# Patient Record
Sex: FEMALE | Race: WHITE | NOT HISPANIC OR LATINO
[De-identification: names, ages, dates, MRNs, and addresses within clinical notes are randomized per-mention and may not be internally consistent; named-entity substitution may affect disease eponyms.]

---

## 2022-07-02 PROBLEM — Z00.00 ENCOUNTER FOR PREVENTIVE HEALTH EXAMINATION: Status: ACTIVE | Noted: 2022-07-02

## 2022-07-07 ENCOUNTER — APPOINTMENT (OUTPATIENT)
Dept: ORTHOPEDIC SURGERY | Facility: CLINIC | Age: 47
End: 2022-07-07

## 2022-07-07 VITALS — BODY MASS INDEX: 44.05 KG/M2 | HEIGHT: 64 IN | WEIGHT: 258 LBS

## 2022-07-07 DIAGNOSIS — Z78.9 OTHER SPECIFIED HEALTH STATUS: ICD-10-CM

## 2022-07-07 DIAGNOSIS — Z85.3 PERSONAL HISTORY OF MALIGNANT NEOPLASM OF BREAST: ICD-10-CM

## 2022-07-07 PROCEDURE — 73030 X-RAY EXAM OF SHOULDER: CPT | Mod: LT

## 2022-07-07 PROCEDURE — 99204 OFFICE O/P NEW MOD 45 MIN: CPT

## 2022-07-07 PROCEDURE — 73010 X-RAY EXAM OF SHOULDER BLADE: CPT | Mod: LT

## 2022-07-08 PROBLEM — Z78.9 NON-SMOKER: Status: ACTIVE | Noted: 2022-07-07

## 2022-07-08 PROBLEM — Z85.3 HISTORY OF MALIGNANT NEOPLASM OF BREAST: Status: RESOLVED | Noted: 2022-07-07 | Resolved: 2022-07-08

## 2022-07-08 PROBLEM — Z78.9 SOCIAL ALCOHOL USE: Status: ACTIVE | Noted: 2022-07-07

## 2022-07-08 NOTE — IMAGING
[Left] : left shoulder [There are no fractures, subluxations or dislocations. No significant abnormalities are seen] : There are no fractures, subluxations or dislocations. No significant abnormalities are seen [de-identified] : The patient is a well appearing 46 year old F of their stated age.\par Neck is supple & nontender to palpation. Negative Spurling's test.\par \par Effected Shoulder:  left\par Inspection:\par Scapula Winging: Negative\par Deformity: None\par Erythema: None\par Ecchymosis: None\par Abrasions: None\par Effusion: None\par \par Range of Motion:\par Active Forward Flexion: 180 degrees \par Active Abduction: 180 degrees \par Passive Forward Flexion: 180 degrees \par Passive Abduction: 180 degrees \par ER @ 90 degrees: 100 degrees\par IR @ 90 degrees: 45 degrees\par ER @ 0 degrees: 50 degrees\par \par Motor Exam:\par Forward Flexion: 3 out of 5\par Flexion Plane of Scapula: 3 out of 5\par Abduction: 3 out of 5\par Internal Rotation: 3 out of 5\par External Rotation: 3 out of 5\par Distal Motor Strength: 5 out of 5\par Stability Testing:\par Anterior: 1+\par Posterior: 1+\par Sulcus N: 1+\par Sulcus ER: 1+\par \par Provocative Tests:\par Drop Arm: Negative\par Impingement: +\par [place]: Negative\par X-Arm Adduction: Negative\par Belly Press: +\par Bear Hug: +\par Lift Off: +\par Apprehension: Negative\par Relocation: Negative\par Posterior Load & Shift: Negative\par Palpation:\par AC Joint: Nontender\par Clavicle: Nontender\par SC Joint: Nontender\par Bicepital Groove: Nontender\par Coracoid Process: Nontender\par Pectoralis Minor Tendon: Nontender\par Pectoralis Major Tendon: Nontender & palpably intact\par Latissimus Dorsi: Nontender \par Proximal Humerus: Nontender\par Scapula Body: Nontender\par Medial Scapula Boarder: Nontender\par Scapula Spine: Nontender\par \par Neurologic Exam: Sensation to Light Touch:\par Axillary: dec\par Ulnar: Grossly intact\par Radial: Grossly intact\par Median: Grossly intact\par Other:  N/A\par Circulatory/Pulses:\par Ulnar: 2+\par Radial: 2+\par Other Pertinent Findings: None\par \par Contralateral Shoulder\par Range of Motion:\par Active Forward Flexion: 180 degrees \par Active Abduction: 180 degrees \par Passive Forward Flexion: 180 degrees \par Passive Abduction: 180 degrees \par ER @ 90 degrees: 100 degrees\par IR @ 90 degrees: 45 degrees\par ER @ 0 degrees: 50 degrees\par Motor Exam:\par Forward Flexion: 5 out of 5\par Flexion Plane of Scapula: 5 out of 5\par Abduction: 5 out of 5\par Internal Rotation: 5 out of 5\par External Rotation: 5 out of 5\par Distal Motor Strength: 5 out of 5\par Stability Testing:\par Anterior: 1+\par Posterior: 1+\par Sulcus N: 1+\par Sulcus ER: 1+\par Other Pertinent Findings: None\par \par \par Assessment: The patient is a 46 year old F with left shoulder pain and radiographic and physical exam findings consistent with suspected RCT\par   \par The patient’s condition is acute\par Documents/Results Reviewed Today: Xray left shoulder scapula\par Tests/Studies Independently Interpreted Today: Xray left shoulder scapula is normal\par Pertinent findings include: Right:  -- Left: , 3/5 strength, +IMP, +BH/BP/LO, tender biceptial groove, decreased sensation axillary \par Confounding medical conditions/concerns: none\par \par Plan: Patient is experiencing weakness and significant pain. They will need to obtain MRI of their left shoulder. B/c of dec sensation in their LUE they will also need to obtain a c-spine MRI. Provided with script for PT\par Tests Ordered: MRI left shoulder to eval for RCT, MRI c-spine to eval for disc herniation\par Prescription Medications Ordered: none\par Braces/DME Ordered: none\par Activity/Work/Sports Status: homemaker \par Additional Instructions: none\par Follow-Up: after MRI\par \par The patient's current medication management of their orthopedic diagnosis was reviewed today:\par (1) We discussed a comprehensive treatment plan that included possible pharmaceutical management involving the use of prescription strength medications including but not limited to options such as oral Naprosyn 500mg BID, once daily Meloxicam 15 mg, or 500-650 mg Tylenol versus over the counter oral medications and topical prescription NSAID Pennsaid vs over the counter Voltaren gel.\par (2) There is a moderate risk of morbidity with further treatment, especially from use of prescription strength medications and possible side effects of these medications which include upset stomach with oral medications, skin reactions to topical medications and cardiac/renal issues with long term use.\par (3) I recommended that the patient follow-up with their medical physician to discuss any significant specific potential issues with long term medication use such as interactions with current medications or with exacerbation of underlying medical comorbidities.\par (4) The benefits and risks associated with use of injectable, oral or topical, prescription and over the counter anti-inflammatory medications were discussed with the patient. The patient voiced understanding of the risks including but not limited to bleeding, stroke, kidney dysfunction, heart disease, and were referred to the black box warning label for further information.\par \par I, eNno Jose, attest that this documentation has been prepared under the direction and in the presence of Provider Dr. Reich\par \par The documentation recorded by the scribe accurately reflects the service I personally performed and the decisions made by me.\par \par

## 2022-07-08 NOTE — HISTORY OF PRESENT ILLNESS
[de-identified] : The patient is a 46 year old right hand dominant female who presents today complaining of left shoulder .\par Date of Injury/Onset: 10/2021\par Pain:    At Rest: 3/10 \par With Activity:  6/10 \par Mechanism of injury: originally injured her shoulder while working out and then reinjured it about 3 weeks ago and has hand constant pain ever since\par This is not a Work Related Injury being treated under Worker's Compensation.\par This is not an athletic injury occurring associated with an interscholastic or organized sports team.\par Quality of symptoms: burning sensation, nagging pain, weakness, occasional numbness in to fingers with overuse\par Improves with: rest, heat\par Worse with: overuse\par Prior treatment: another Ortho, does not remember name\par Prior Imaging: none\par Out of work/sport: not working\par 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

## 2022-07-18 ENCOUNTER — RESULT REVIEW (OUTPATIENT)
Age: 47
End: 2022-07-18

## 2022-07-21 ENCOUNTER — TRANSCRIPTION ENCOUNTER (OUTPATIENT)
Age: 47
End: 2022-07-21

## 2022-07-28 ENCOUNTER — APPOINTMENT (OUTPATIENT)
Dept: ORTHOPEDIC SURGERY | Facility: CLINIC | Age: 47
End: 2022-07-28

## 2022-07-28 VITALS — BODY MASS INDEX: 44.05 KG/M2 | HEIGHT: 64 IN | WEIGHT: 258 LBS

## 2022-07-28 DIAGNOSIS — M75.112 INCOMPLETE ROTATOR CUFF TEAR OR RUPTURE OF LEFT SHOULDER, NOT SPECIFIED AS TRAUMATIC: ICD-10-CM

## 2022-07-28 PROCEDURE — 99214 OFFICE O/P EST MOD 30 MIN: CPT

## 2022-07-28 RX ORDER — METHYLPREDNISOLONE 4 MG/1
4 TABLET ORAL
Qty: 1 | Refills: 0 | Status: ACTIVE | COMMUNITY
Start: 2022-07-28 | End: 1900-01-01

## 2022-07-29 PROBLEM — M75.112 NONTRAUMATIC INCOMPLETE TEAR OF LEFT ROTATOR CUFF: Status: ACTIVE | Noted: 2022-07-28

## 2022-07-29 NOTE — HISTORY OF PRESENT ILLNESS
[de-identified] : The patient is a 46 year old right hand dominant female who presents today complaining of left shoulder .\par Date of Injury/Onset: 10/2021\par Pain:    At Rest: 3/10 \par With Activity:  6/10 \par Mechanism of injury: originally injured her shoulder while working out and then reinjured it about 3 weeks ago and has hand constant pain ever since\par This is not a Work Related Injury being treated under Worker's Compensation.\par This is not an athletic injury occurring associated with an interscholastic or organized sports team.\par Quality of symptoms: burning sensation, nagging pain, weakness, occasional numbness in to fingers with overuse\par Improves with: rest, heat\par Worse with: overuse\par Treatment/Imaging/Studies Since Last Visit: MRI\par 	Reports Available For Review Today: MRI report\par Out of work/sport: not working\par 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

## 2022-07-29 NOTE — DATA REVIEWED
[Cervical Spine] : cervical spine [MRI] : MRI [Left] : left [Shoulder] : shoulder [Report was reviewed and noted in the chart] : The report was reviewed and noted in the chart [I independently reviewed and interpreted images and report] : I independently reviewed and interpreted images and report [I reviewed the films/CD and additionally noted] : I reviewed the films/CD and additionally noted [FreeTextEntry1] : disc herniation impinging on C7 nerve root to the left [FreeTextEntry2] : partial rotator cuff tear

## 2022-07-29 NOTE — IMAGING
[Left] : left shoulder [There are no fractures, subluxations or dislocations. No significant abnormalities are seen] : There are no fractures, subluxations or dislocations. No significant abnormalities are seen [de-identified] : The patient is a well appearing 46 year old F of their stated age.\par Neck is supple & nontender to palpation. Negative Spurling's test.\par \par Effected Shoulder:  left\par Inspection:\par Scapula Winging: Negative\par Deformity: None\par Erythema: None\par Ecchymosis: None\par Abrasions: None\par Effusion: None\par \par Range of Motion:\par Active Forward Flexion: 180 degrees \par Active Abduction: 180 degrees \par Passive Forward Flexion: 180 degrees \par Passive Abduction: 180 degrees \par ER @ 90 degrees: 100 degrees\par IR @ 90 degrees: 45 degrees\par ER @ 0 degrees: 50 degrees\par \par Motor Exam:\par Forward Flexion: 3 out of 5\par Flexion Plane of Scapula: 3 out of 5\par Abduction: 3 out of 5\par Internal Rotation: 3 out of 5\par External Rotation: 3 out of 5\par Distal Motor Strength: 5 out of 5\par Stability Testing:\par Anterior: 1+\par Posterior: 1+\par Sulcus N: 1+\par Sulcus ER: 1+\par \par Provocative Tests:\par Drop Arm: Negative\par Impingement: +\par [place]: Negative\par X-Arm Adduction: Negative\par Belly Press: +\par Bear Hug: +\par Lift Off: +\par Apprehension: Negative\par Relocation: Negative\par Posterior Load & Shift: Negative\par Palpation:\par AC Joint: Nontender\par Clavicle: Nontender\par SC Joint: Nontender\par Bicepital Groove: Nontender\par Coracoid Process: Nontender\par Pectoralis Minor Tendon: Nontender\par Pectoralis Major Tendon: Nontender & palpably intact\par Latissimus Dorsi: Nontender \par Proximal Humerus: Nontender\par Scapula Body: Nontender\par Medial Scapula Boarder: Nontender\par Scapula Spine: Nontender\par \par Neurologic Exam: Sensation to Light Touch:\par Axillary: dec\par Ulnar: Grossly intact\par Radial: Grossly intact\par Median: Grossly intact\par Other:  N/A\par Circulatory/Pulses:\par Ulnar: 2+\par Radial: 2+\par Other Pertinent Findings: None\par \par Contralateral Shoulder\par Range of Motion:\par Active Forward Flexion: 180 degrees \par Active Abduction: 180 degrees \par Passive Forward Flexion: 180 degrees \par Passive Abduction: 180 degrees \par ER @ 90 degrees: 100 degrees\par IR @ 90 degrees: 45 degrees\par ER @ 0 degrees: 50 degrees\par Motor Exam:\par Forward Flexion: 5 out of 5\par Flexion Plane of Scapula: 5 out of 5\par Abduction: 5 out of 5\par Internal Rotation: 5 out of 5\par External Rotation: 5 out of 5\par Distal Motor Strength: 5 out of 5\par Stability Testing:\par Anterior: 1+\par Posterior: 1+\par Sulcus N: 1+\par Sulcus ER: 1+\par Other Pertinent Findings: None\par \par \par Assessment: The patient is a 46 year old F with left shoulder pain and radiographic and physical exam findings consistent with cervical disc herniation and partial RCT\par   \par The patient’s condition is acute\par Documents/Results Reviewed Today: MRI cervical spine, MRI left shoulder\par Tests/Studies Independently Interpreted Today: MRI cervical spine reveals disc herniation impinging on C7 nerve root to the left -- MRI left shoulder reveals partial RCT\par Pertinent findings include: Right:  -- Left: , 3/5 strength, +IMP, +BH/BP/LO, tender biceptial groove, decreased sensation axillary \par Confounding medical conditions/concerns: none\par \par Plan: Patient is referred to Dr. Houston for [facility] consultation. Provided with script for PT. \par Tests Ordered: none\par Prescription Medications Ordered: MDP\par Braces/DME Ordered: none\par Activity/Work/Sports Status: homemaker \par Additional Instructions: none\par Follow-Up: 6wks\par \par The patient's current medication management of their orthopedic diagnosis was reviewed today:\par (1) We discussed a comprehensive treatment plan that included possible pharmaceutical management involving the use of prescription strength medications including but not limited to options such as oral Naprosyn 500mg BID, once daily Meloxicam 15 mg, or 500-650 mg Tylenol versus over the counter oral medications and topical prescription NSAID Pennsaid vs over the counter Voltaren gel.\par (2) There is a moderate risk of morbidity with further treatment, especially from use of prescription strength medications and possible side effects of these medications which include upset stomach with oral medications, skin reactions to topical medications and cardiac/renal issues with long term use.\par (3) I recommended that the patient follow-up with their medical physician to discuss any significant specific potential issues with long term medication use such as interactions with current medications or with exacerbation of underlying medical comorbidities.\par (4) The benefits and risks associated with use of injectable, oral or topical, prescription and over the counter anti-inflammatory medications were discussed with the patient. The patient voiced understanding of the risks including but not limited to bleeding, stroke, kidney dysfunction, heart disease, and were referred to the black box warning label for further information.\par \par I, Neno Jose, attest that this documentation has been prepared under the direction and in the presence of Provider Dr. Reich\par \par The documentation recorded by the scribe accurately reflects the service I personally performed and the decisions made by me.\par

## 2022-09-08 ENCOUNTER — APPOINTMENT (OUTPATIENT)
Dept: PAIN MANAGEMENT | Facility: CLINIC | Age: 47
End: 2022-09-08

## 2022-09-08 VITALS — WEIGHT: 258 LBS | HEIGHT: 64 IN | BODY MASS INDEX: 44.05 KG/M2

## 2022-09-08 DIAGNOSIS — M25.512 PAIN IN LEFT SHOULDER: ICD-10-CM

## 2022-09-08 DIAGNOSIS — M54.12 RADICULOPATHY, CERVICAL REGION: ICD-10-CM

## 2022-09-08 DIAGNOSIS — R20.2 PARESTHESIA OF SKIN: ICD-10-CM

## 2022-09-08 DIAGNOSIS — M50.20 OTHER CERVICAL DISC DISPLACEMENT, UNSPECIFIED CERVICAL REGION: ICD-10-CM

## 2022-09-08 PROCEDURE — 99203 OFFICE O/P NEW LOW 30 MIN: CPT

## 2022-09-08 NOTE — DATA REVIEWED
[Cervical Spine] : cervical spine [MRI] : MRI [Left] : left [Shoulder] : shoulder [Report was reviewed and noted in the chart] : The report was reviewed and noted in the chart [I reviewed the films/CD] : I reviewed the films/CD

## 2022-09-09 NOTE — HISTORY OF PRESENT ILLNESS
[Neck] : neck [Sudden] : sudden [6] : 6 [Diffuse] : diffuse [Dull/Aching] : dull/aching [Constant] : constant [Household chores] : household chores [Leisure] : leisure [Meds] : meds [Exercising] : exercising [] : Post Surgical Visit: no [FreeTextEntry1] : left shoulder [FreeTextEntry7] : left arm, left hand and fingers [de-identified] : cervical mri at [facility] [place]

## 2022-09-09 NOTE — ASSESSMENT
[FreeTextEntry1] : A discussion regarding available pain management treatment options occurred with the patient.  These included interventional, rehabilitative, pharmacological, and alternative modalities. We will proceed with the following: \par \par Interventional treatment options: \par - Discussed left PM TRIPP for diagnostic / therapeutic benefit \par - will await results of EMG study to rule out peripheral entrapment / neuropathy \par - see additional instructions below \par \par Rehabilitative options:\par - continue physical therapy \par - participation in active HEP was discussed \par \par Medication based treatment options: \par - completed MDP \par - continue OTC NSAIDS PRN \par - see additional instructions below \par \par Complementary treatment options: \par - Weight management and lifestyle modifications discussed \par - See additional instructions below \par \par Additional treatment recommendations as follows: \par - obtain EMG /NCV BUE to rule out peripheral nerve entrapment \par - F/U for EMG review \par \par The documentation recorded by the scribe, in my presence, accurately reflects the service I personally performed and the decisions made by me with my edits as appropriate.\par \par I, Paresh Ferreira acting as scribe, attest that this documentation has been prepared under the direction and in the presence of Provider Raul Haddad DO.

## 2022-09-09 NOTE — PHYSICAL EXAM
[Normal Coordination] : normal coordination [Normal Mood and Affect] : normal mood and affect [Orientated] : orientated [Left] : left hand [de-identified] : obesity [] : negative Roberts reflex [de-identified] : Reduced sensation to light touch LUE

## 2022-09-09 NOTE — REASON FOR VISIT
[Initial Consultation] : an initial pain management consultation [FreeTextEntry2] : left shoulder and neck pain

## 2022-09-22 ENCOUNTER — APPOINTMENT (OUTPATIENT)
Dept: ORTHOPEDIC SURGERY | Facility: CLINIC | Age: 47
End: 2022-09-22

## 2023-02-16 ENCOUNTER — NON-APPOINTMENT (OUTPATIENT)
Age: 48
End: 2023-02-16

## 2024-04-15 ENCOUNTER — EMERGENCY (EMERGENCY)
Facility: HOSPITAL | Age: 49
LOS: 1 days | Discharge: HOME | End: 2024-04-15
Attending: EMERGENCY MEDICINE
Payer: COMMERCIAL

## 2024-04-15 VITALS
SYSTOLIC BLOOD PRESSURE: 127 MMHG | RESPIRATION RATE: 20 BRPM | OXYGEN SATURATION: 98 % | DIASTOLIC BLOOD PRESSURE: 82 MMHG | HEART RATE: 86 BPM

## 2024-04-15 VITALS — HEIGHT: 67 IN | WEIGHT: 193.57 LBS | RESPIRATION RATE: 16 BRPM

## 2024-04-15 DIAGNOSIS — Z85.3 PERSONAL HISTORY OF MALIGNANT NEOPLASM OF BREAST: ICD-10-CM

## 2024-04-15 DIAGNOSIS — Z86.16 PERSONAL HISTORY OF COVID-19: ICD-10-CM

## 2024-04-15 DIAGNOSIS — E11.9 TYPE 2 DIABETES MELLITUS WITHOUT COMPLICATIONS: ICD-10-CM

## 2024-04-15 DIAGNOSIS — R00.2 PALPITATIONS: ICD-10-CM

## 2024-04-15 DIAGNOSIS — I26.99 OTHER PULMONARY EMBOLISM WITHOUT ACUTE COR PULMONALE: ICD-10-CM

## 2024-04-15 LAB
ALBUMIN SERPL ELPH-MCNC: 4.1 G/DL — SIGNIFICANT CHANGE UP (ref 3.3–5.2)
ALP SERPL-CCNC: 60 U/L — SIGNIFICANT CHANGE UP (ref 40–120)
ALT FLD-CCNC: 13 U/L — SIGNIFICANT CHANGE UP
ANION GAP SERPL CALC-SCNC: 12 MMOL/L — SIGNIFICANT CHANGE UP (ref 5–17)
AST SERPL-CCNC: 15 U/L — SIGNIFICANT CHANGE UP
BASOPHILS # BLD AUTO: 0.04 K/UL — SIGNIFICANT CHANGE UP (ref 0–0.2)
BASOPHILS NFR BLD AUTO: 0.7 % — SIGNIFICANT CHANGE UP (ref 0–2)
BILIRUB SERPL-MCNC: 0.4 MG/DL — SIGNIFICANT CHANGE UP (ref 0.4–2)
BUN SERPL-MCNC: 15.3 MG/DL — SIGNIFICANT CHANGE UP (ref 8–20)
CALCIUM SERPL-MCNC: 9.3 MG/DL — SIGNIFICANT CHANGE UP (ref 8.4–10.5)
CHLORIDE SERPL-SCNC: 104 MMOL/L — SIGNIFICANT CHANGE UP (ref 96–108)
CO2 SERPL-SCNC: 26 MMOL/L — SIGNIFICANT CHANGE UP (ref 22–29)
CREAT SERPL-MCNC: 0.8 MG/DL — SIGNIFICANT CHANGE UP (ref 0.5–1.3)
D DIMER BLD IA.RAPID-MCNC: 294 NG/ML DDU — HIGH
EGFR: 90 ML/MIN/1.73M2 — SIGNIFICANT CHANGE UP
EOSINOPHIL # BLD AUTO: 0.07 K/UL — SIGNIFICANT CHANGE UP (ref 0–0.5)
EOSINOPHIL NFR BLD AUTO: 1.2 % — SIGNIFICANT CHANGE UP (ref 0–6)
GLUCOSE SERPL-MCNC: 72 MG/DL — SIGNIFICANT CHANGE UP (ref 70–99)
HCG SERPL-ACNC: <4 MIU/ML — SIGNIFICANT CHANGE UP
HCT VFR BLD CALC: 38.8 % — SIGNIFICANT CHANGE UP (ref 34.5–45)
HGB BLD-MCNC: 13.3 G/DL — SIGNIFICANT CHANGE UP (ref 11.5–15.5)
IMM GRANULOCYTES NFR BLD AUTO: 0.2 % — SIGNIFICANT CHANGE UP (ref 0–0.9)
LYMPHOCYTES # BLD AUTO: 2.79 K/UL — SIGNIFICANT CHANGE UP (ref 1–3.3)
LYMPHOCYTES # BLD AUTO: 48.1 % — HIGH (ref 13–44)
MCHC RBC-ENTMCNC: 32.4 PG — SIGNIFICANT CHANGE UP (ref 27–34)
MCHC RBC-ENTMCNC: 34.3 GM/DL — SIGNIFICANT CHANGE UP (ref 32–36)
MCV RBC AUTO: 94.6 FL — SIGNIFICANT CHANGE UP (ref 80–100)
MONOCYTES # BLD AUTO: 0.29 K/UL — SIGNIFICANT CHANGE UP (ref 0–0.9)
MONOCYTES NFR BLD AUTO: 5 % — SIGNIFICANT CHANGE UP (ref 2–14)
NEUTROPHILS # BLD AUTO: 2.6 K/UL — SIGNIFICANT CHANGE UP (ref 1.8–7.4)
NEUTROPHILS NFR BLD AUTO: 44.8 % — SIGNIFICANT CHANGE UP (ref 43–77)
PLATELET # BLD AUTO: 277 K/UL — SIGNIFICANT CHANGE UP (ref 150–400)
POTASSIUM SERPL-MCNC: 3.6 MMOL/L — SIGNIFICANT CHANGE UP (ref 3.5–5.3)
POTASSIUM SERPL-SCNC: 3.6 MMOL/L — SIGNIFICANT CHANGE UP (ref 3.5–5.3)
PROT SERPL-MCNC: 6.8 G/DL — SIGNIFICANT CHANGE UP (ref 6.6–8.7)
RBC # BLD: 4.1 M/UL — SIGNIFICANT CHANGE UP (ref 3.8–5.2)
RBC # FLD: 12.1 % — SIGNIFICANT CHANGE UP (ref 10.3–14.5)
SODIUM SERPL-SCNC: 142 MMOL/L — SIGNIFICANT CHANGE UP (ref 135–145)
TROPONIN T, HIGH SENSITIVITY RESULT: <6 NG/L — SIGNIFICANT CHANGE UP (ref 0–51)
WBC # BLD: 5.8 K/UL — SIGNIFICANT CHANGE UP (ref 3.8–10.5)
WBC # FLD AUTO: 5.8 K/UL — SIGNIFICANT CHANGE UP (ref 3.8–10.5)

## 2024-04-15 PROCEDURE — 36415 COLL VENOUS BLD VENIPUNCTURE: CPT

## 2024-04-15 PROCEDURE — 71046 X-RAY EXAM CHEST 2 VIEWS: CPT | Mod: 26

## 2024-04-15 PROCEDURE — 93010 ELECTROCARDIOGRAM REPORT: CPT

## 2024-04-15 PROCEDURE — 84702 CHORIONIC GONADOTROPIN TEST: CPT

## 2024-04-15 PROCEDURE — 80053 COMPREHEN METABOLIC PANEL: CPT

## 2024-04-15 PROCEDURE — 93005 ELECTROCARDIOGRAM TRACING: CPT

## 2024-04-15 PROCEDURE — 85379 FIBRIN DEGRADATION QUANT: CPT

## 2024-04-15 PROCEDURE — 71275 CT ANGIOGRAPHY CHEST: CPT | Mod: MC

## 2024-04-15 PROCEDURE — 71046 X-RAY EXAM CHEST 2 VIEWS: CPT

## 2024-04-15 PROCEDURE — 99285 EMERGENCY DEPT VISIT HI MDM: CPT | Mod: 25

## 2024-04-15 PROCEDURE — 71275 CT ANGIOGRAPHY CHEST: CPT | Mod: 26,MC

## 2024-04-15 PROCEDURE — 84443 ASSAY THYROID STIM HORMONE: CPT

## 2024-04-15 PROCEDURE — 99285 EMERGENCY DEPT VISIT HI MDM: CPT

## 2024-04-15 PROCEDURE — 84484 ASSAY OF TROPONIN QUANT: CPT

## 2024-04-15 PROCEDURE — 85025 COMPLETE CBC W/AUTO DIFF WBC: CPT

## 2024-04-15 RX ORDER — APIXABAN 2.5 MG/1
1 TABLET, FILM COATED ORAL
Qty: 1 | Refills: 0
Start: 2024-04-15

## 2024-04-15 RX ORDER — APIXABAN 2.5 MG/1
10 TABLET, FILM COATED ORAL ONCE
Refills: 0 | Status: COMPLETED | OUTPATIENT
Start: 2024-04-15 | End: 2024-04-15

## 2024-04-15 RX ADMIN — APIXABAN 10 MILLIGRAM(S): 2.5 TABLET, FILM COATED ORAL at 21:36

## 2024-04-15 NOTE — ED PROVIDER NOTE - CARE PROVIDER_API CALL
Matthew Rodriguez  Cardiovascular Disease  [facility], [address] [address]  Phone: [phone]  Fax: [phone]  Follow Up Time:

## 2024-04-15 NOTE — ED PROVIDER NOTE - OBJECTIVE STATEMENT
48 year old female with PMH of DM2 and breast cancer presents to the ED after being encouraged by outpatient PCP to seek evaluation to rule out possible PE due to intermittent palpitations and occasional shortness of breath upon exertion following recent COVID infection that began on 4/8. Patient denies fever/chills, dyspnea, current shortness of breath, chest pain, dizziness, nausea, vomiting, abdominal pain, nor diarrhea. Patient states she has no prior history of DVTs or hypercoagulability, states this is the fourth time she has had COVID infection.

## 2024-04-15 NOTE — ED PROVIDER NOTE - NSFOLLOWUPINSTRUCTIONS_ED_ALL_ED_FT
Start Eliquis as instructed   follow-up with your hematologist and cardiologist-call to schedule appointment tomorrow  Return sooner for any problems        Pulmonary Embolism: Care Instructions  Inside view of chest, showing lungs, blood vessels in lungs, and heart.  Overview  Pulmonary embolism is the sudden blockage of an artery in the lung. Blood clots in the deep veins of the leg or pelvis (deep vein thrombosis, or DVT) are the most common cause. These blood clots can travel to the lungs.    Pulmonary embolism can be very serious. Because you have had one pulmonary embolism, you are at greater risk for having another one. But you can take steps to prevent another pulmonary embolism.    You will probably take a prescription blood-thinning medicine to prevent blood clots. A blood thinner can stop a blood clot from growing larger and prevent new clots from forming.    Follow-up care is a key part of your treatment and safety. Be sure to make and go to all appointments, and call your doctor or nurse advice line (347 in most provinces and territories) if you are having problems. It's also a good idea to know your test results and keep a list of the medicines you take.    How can you care for yourself at home?  Take your medicines exactly as prescribed. Call your doctor or nurse advice line if you think you are having a problem with your medicine. You will get more details on the specific medicines your doctor prescribes.  If you are taking a blood thinner, be sure you get instructions about how to take your medicine safely. Blood thinners can cause serious bleeding problems.  Try to walk several times a day. Walking helps keep blood moving in your legs. Before doing other types of exercise, ask your doctor what type and level of exercise is safe for you.  Take steps to help prevent blood clots in your legs. For example:  Exercise your lower leg muscles if you sit for long periods of time. Pump your feet up and down by pulling your toes up toward your knees then pointing them down. Repeat.  After an illness or surgery, try to get up and out of bed often. If you can't get out of bed, flex your feet every hour to keep the blood moving through your legs.  Take plenty of breaks when you travel. On long car trips, stop the car and walk around every hour or so. On the bus, plane, or train, get out of your seat and walk up and down the aisle every hour, if you can.  Wear compression stockings if your doctor recommends them.  Check with your doctor about whether you should use hormonal forms of birth control or hormone therapy. These may increase your risk of blood clots.  Have a healthy lifestyle. This includes being active, staying at a healthy weight, and not smoking.  Get vaccinated against COVID-19, influenza (flu), and pneumonia.  When should you call for help?  	  Call 911 anytime you think you may need emergency care. For example, call if:    You have shortness of breath.  You have chest pain.  You passed out (lost consciousness).  You cough up blood.  Call your doctor or nurse advice line now or seek immediate medical care if:    You have new or worsening pain or swelling in your leg.  Watch closely for changes in your health, and be sure to contact your doctor or nurse advice line if:    You do not get better as expected.

## 2024-04-15 NOTE — ED PROVIDER NOTE - CLINICAL SUMMARY MEDICAL DECISION MAKING FREE TEXT BOX
48 year old female with PMH of DM2 and breast cancer presents to the ED after being encouraged by outpatient PCP to seek evaluation to rule out possible PE due to intermittent palpitations and occasional shortness of breath upon exertion following recent COVID infection that began on 4/8. Patient denies fever/chills, dyspnea, current shortness of breath, chest pain, dizziness, nausea, vomiting, abdominal pain, nor diarrhea. Patient states she has no prior history of DVTs or hypercoagulability, states this is the fourth time she has had COVID infection.   will check labs including D-dimer EKG and reevaluate

## 2024-04-15 NOTE — ED ADULT NURSE REASSESSMENT NOTE - NS ED NURSE REASSESS COMMENT FT1
Assumed care of pt from ANDREY Delatorre RN at 1915. Pt is resting comfortably in stretcher. NAD. Pt is A&Ox4. Respirations are even and unlabored. Color is appropriate for race. Pt awaiting CT scan results and provider reassessment. PT NSR on cardiac monitor. Pt updated on plan of care.

## 2024-04-15 NOTE — ED PROVIDER NOTE - ATTENDING CONTRIBUTION TO CARE
49 yo With history of type 2 diabetes and breast cancer presents to the ED referred by her PMD for further evaluation of palpitations after having COVID  for the fourth time, 1 week ago..  Patient admits to occasional shortness of breath.   Patient denies any associate chest pain, nausea, vomiting diaphoresis or syncope.   EKG  normal sinus  rhythm with no acute changes.   on exam patient awake and alert no acute distress cardiac monitor sinus in the 70s, PERRL, throat clear mucosa members moist, neck supple, heart regular, lungs clear bilaterally abdomen soft nontender, extremities no cyanosis edema full range of motion neuro no focal deficits.   based on history we will check labs including D-dimer cardiac enzymes and reevaluate.  If not D-dimer positive patient will require CT of the chest to rule out PE

## 2024-04-15 NOTE — ED ADULT NURSE NOTE - NSFALLUNIVINTERV_ED_ALL_ED
Bed/Stretcher in lowest position, wheels locked, appropriate side rails in place/Call bell, personal items and telephone in reach/Instruct patient to call for assistance before getting out of bed/chair/stretcher/Non-slip footwear applied when patient is off stretcher/[place] to call system/Physically safe environment - no spills, clutter or unnecessary equipment/Purposeful proactive rounding/Room/bathroom lighting operational, light cord in reach

## 2024-04-15 NOTE — ED PROVIDER NOTE - CHPI ED SYMPTOMS NEG
no chest pain/no cough/no fever/no nausea/no shortness of breath/no syncope/no vomiting/no chills/no diaphoresis

## 2024-04-15 NOTE — ED PROVIDER NOTE - PATIENT PORTAL LINK FT
You can access the FollowMyHealth Patient Portal offered by [facility] by registering at the following website: [email or web address]. By joining "[employer]’s FollowMyHealth portal, you will also be able to view your health information using other applications (apps) compatible with our system.

## 2024-04-15 NOTE — ED PROVIDER NOTE - PHYSICAL EXAMINATION
PHYSICAL EXAM:  General: Calm, laying in bed. No acute distress.   HEENT: MMM  Neuro: No acute distress, AOx3, conversational, following commands. Negative focal deficits.   CV: Normal rate, normal rhythm. Negative rubs, gallops, or murmurs appreciated.   Pulm: Clear to auscultation in all fields   Abd: Soft, nontender, nondistended  Ext: No edema  Skin: warm, dry

## 2024-04-15 NOTE — ED ADULT NURSE NOTE - OBJECTIVE STATEMENT
Patient is alert and oriented X4 from home. Patient is having chest pain and sob. Patient was recently recovered from covid. sent in by pcp for r/o PE.

## 2024-04-15 NOTE — ED PROVIDER NOTE - PROGRESS NOTE DETAILS
Labs and CT results as noted and discussed with patient.  Patient with prior history of breast cancer and status post COVID.  Patient has cardiologist at [place] to follow-up with as well as her hematologist/oncologist for her breast cancer.  Will start on Eliquis.  Instructions and precautions reviewed

## 2024-04-18 ENCOUNTER — RX ONLY (RX ONLY)
Age: 49
End: 2024-04-18

## 2024-04-18 ENCOUNTER — OFFICE (OUTPATIENT)
Facility: LOCATION | Age: 49
Setting detail: OPHTHALMOLOGY
End: 2024-04-18
Payer: COMMERCIAL

## 2024-04-18 DIAGNOSIS — H25.13: ICD-10-CM

## 2024-04-18 DIAGNOSIS — H16.223: ICD-10-CM

## 2024-04-18 DIAGNOSIS — H40.023: ICD-10-CM

## 2024-04-18 DIAGNOSIS — H31.29: ICD-10-CM

## 2024-04-18 PROCEDURE — 76514 ECHO EXAM OF EYE THICKNESS: CPT | Performed by: OPHTHALMOLOGY

## 2024-04-18 PROCEDURE — 92250 FUNDUS PHOTOGRAPHY W/I&R: CPT | Performed by: OPHTHALMOLOGY

## 2024-04-18 PROCEDURE — 92004 COMPRE OPH EXAM NEW PT 1/>: CPT | Performed by: OPHTHALMOLOGY

## 2024-04-22 PROBLEM — H31.29 PERIPAPILLARY ATROPHY ; BOTH EYES: Status: ACTIVE | Noted: 2024-04-18

## 2024-05-06 ENCOUNTER — OFFICE (OUTPATIENT)
Facility: LOCATION | Age: 49
Setting detail: OPHTHALMOLOGY
End: 2024-05-06
Payer: COMMERCIAL

## 2024-05-06 DIAGNOSIS — H31.29: ICD-10-CM

## 2024-05-06 DIAGNOSIS — E11.9: ICD-10-CM

## 2024-05-06 DIAGNOSIS — H16.223: ICD-10-CM

## 2024-05-06 DIAGNOSIS — H40.023: ICD-10-CM

## 2024-05-06 PROCEDURE — 92283 EXTND COLOR VISION XM: CPT | Performed by: OPHTHALMOLOGY

## 2024-05-06 PROCEDURE — 99213 OFFICE O/P EST LOW 20 MIN: CPT | Performed by: OPHTHALMOLOGY

## 2024-05-06 PROCEDURE — 92083 EXTENDED VISUAL FIELD XM: CPT | Performed by: OPHTHALMOLOGY

## 2024-05-06 PROCEDURE — 92133 CPTRZD OPH DX IMG PST SGM ON: CPT | Performed by: OPHTHALMOLOGY

## 2024-05-06 PROCEDURE — 92020 GONIOSCOPY: CPT | Performed by: OPHTHALMOLOGY

## 2024-05-06 ASSESSMENT — CONFRONTATIONAL VISUAL FIELD TEST (CVF)
OD_FINDINGS: FULL
OS_FINDINGS: FULL